# Patient Record
Sex: FEMALE | Race: BLACK OR AFRICAN AMERICAN
[De-identification: names, ages, dates, MRNs, and addresses within clinical notes are randomized per-mention and may not be internally consistent; named-entity substitution may affect disease eponyms.]

---

## 2018-06-14 ENCOUNTER — HOSPITAL ENCOUNTER (OUTPATIENT)
Dept: HOSPITAL 92 - BICMAMMO | Age: 63
Discharge: HOME | End: 2018-06-14
Payer: COMMERCIAL

## 2018-06-14 DIAGNOSIS — M85.88: ICD-10-CM

## 2018-06-14 DIAGNOSIS — M81.0: ICD-10-CM

## 2018-06-14 DIAGNOSIS — Z78.0: ICD-10-CM

## 2018-06-14 DIAGNOSIS — Z80.3: ICD-10-CM

## 2018-06-14 DIAGNOSIS — Z01.419: ICD-10-CM

## 2018-06-14 DIAGNOSIS — Z12.31: Primary | ICD-10-CM

## 2018-06-14 PROCEDURE — 77080 DXA BONE DENSITY AXIAL: CPT

## 2018-06-14 PROCEDURE — 77063 BREAST TOMOSYNTHESIS BI: CPT

## 2018-06-14 PROCEDURE — 77067 SCR MAMMO BI INCL CAD: CPT

## 2019-02-22 ENCOUNTER — HOSPITAL ENCOUNTER (OUTPATIENT)
Dept: HOSPITAL 92 - BICCT | Age: 64
Discharge: HOME | End: 2019-02-22
Attending: OBSTETRICS & GYNECOLOGY
Payer: MEDICARE

## 2019-02-22 DIAGNOSIS — K76.89: ICD-10-CM

## 2019-02-22 DIAGNOSIS — R19.00: Primary | ICD-10-CM

## 2019-02-22 LAB — ESTIMATED GFR-MDRD - POC: 76

## 2019-02-22 PROCEDURE — 82565 ASSAY OF CREATININE: CPT

## 2019-02-22 PROCEDURE — 74178 CT ABD&PLV WO CNTR FLWD CNTR: CPT

## 2019-02-22 NOTE — CT
CT ABDOMEN AND PELVIS WITH AND WITHOUT IV CONTRAST:

 

Date: 2-22-19 

 

Provided Clinical History: 

Abdominal and pelvic swelling mass and lump. 

 

FINDINGS: 

The visualized lung bases are free of significant opacity. There is a calcified nodule involving the 
right lower lung adjacent to the IVC. 

 

There are multiple small, subcentimeter foci of diminished attenuation involving the periphery of the
 liver to small to definitively characterize. The spleen, pancreas, and adrenal glands appear unremar
kable. The kidneys demonstrate no evidence for hydronephrosis or mass. 

 

There is a large complex cystic and solid mass present within the central aspects of the pelvis. This
 measures at least 15.7 x 8.8 cm in greatest transverse dimensions and at least 9.1 cm in craniocauda
l dimension. This displaces and is inseparable from portions of the sigmoid colon and pelvic small leilani
wel. There is free intraperitoneal fluid present within the abdomen and pelvis. There is fluid extend
ing into the left inguinal canal compatible with fluid containing inguinal hernia. There is a linear 
density associated with small bowel in the left lateral midabdomen that may reflect post-operative ch
allie or potentially ingested material. 

 

There is no bowel dilatation or lymph node enlargement apparent. 

 

The osseous structures demonstrate no concerning osteoblastic or osteolytic lesions. 

 

IMPRESSION: 

1. Large cystic and solid pelvic mass likely reflecting an ovarian carcinoma. Free intraperitoneal fl
uid suggests associated peritoneal carcinomatosis. 

2. Multiple subcentimeter hepatic hypodensities, too small to definitively characterize. Given their 
primarily peripheral, subcapsular location, metastases cannot be excluded.  

 

POS: [facility]

## 2019-06-24 ENCOUNTER — HOSPITAL ENCOUNTER (OUTPATIENT)
Dept: HOSPITAL 92 - BICMAMMO | Age: 64
Discharge: HOME | End: 2019-06-24
Payer: MEDICARE

## 2019-06-24 DIAGNOSIS — Z80.3: ICD-10-CM

## 2019-06-24 DIAGNOSIS — Z12.31: Primary | ICD-10-CM

## 2019-06-24 PROCEDURE — 77067 SCR MAMMO BI INCL CAD: CPT

## 2019-06-24 PROCEDURE — 77063 BREAST TOMOSYNTHESIS BI: CPT

## 2019-06-24 NOTE — MMO
Bilateral MAMMO Bilat Screen [facility].

 

CLINICAL HISTORY:

Patient is 63 years old and is seen for screening. The patient has the following

family history of breast cancer:  sister.  The patient has no personal history

of cancer.

 

VIEWS:

The views performed were:  bilateral craniocaudal with tomosynthesis and

bilateral mediolateral oblique with tomosynthesis.

 

FILMS COMPARED:

The present examination has been compared to prior imaging studies performed at

[facility] on 05/27/2014, and at [facility]

on 11/08/2010, 01/20/2017 and 06/14/2018.

 

MAMMOGRAM FINDINGS:

There are scattered fibroglandular densities.

 

There are no suspicious masses, suspicious calcifications, or new areas of

architectural distortion.

 

IMPRESSION:

THERE IS NO MAMMOGRAPHIC EVIDENCE OF MALIGNANCY.

 

A ROUTINE FOLLOW-UP MAMMOGRAM IN 1 YEAR IS RECOMMENDED.

 

THE RESULTS OF THIS EXAM WERE SENT TO THE PATIENT.

 

ACR BI-RADS Category 1 - Negative

 

MAMMOGRAPHY NOTE:

 1. A negative mammogram report should not delay a biopsy if a dominant of

 clinically suspicious mass is present.

 2. Approximately 10% to 15% of breast cancers are not detected by

 mammography.

 3. Adenosis and dense breasts may obscure an underlying neoplasm.

## 2021-04-09 ENCOUNTER — HOSPITAL ENCOUNTER (OUTPATIENT)
Dept: HOSPITAL 92 - BICMAMMO | Age: 66
Discharge: HOME | End: 2021-04-09
Payer: MEDICARE

## 2021-04-09 DIAGNOSIS — Z80.3: ICD-10-CM

## 2021-04-09 DIAGNOSIS — M85.89: ICD-10-CM

## 2021-04-09 DIAGNOSIS — M81.0: ICD-10-CM

## 2021-04-09 DIAGNOSIS — Z12.31: Primary | ICD-10-CM

## 2021-04-09 PROCEDURE — 77080 DXA BONE DENSITY AXIAL: CPT

## 2021-04-09 PROCEDURE — 77063 BREAST TOMOSYNTHESIS BI: CPT

## 2021-04-09 PROCEDURE — 77067 SCR MAMMO BI INCL CAD: CPT

## 2021-06-18 ENCOUNTER — HOSPITAL ENCOUNTER (OUTPATIENT)
Dept: HOSPITAL 9 - MADLAB | Age: 66
Discharge: HOME | End: 2021-06-18
Attending: FAMILY MEDICINE
Payer: MEDICARE

## 2021-06-18 DIAGNOSIS — M16.0: ICD-10-CM

## 2021-06-18 DIAGNOSIS — M25.551: Primary | ICD-10-CM

## 2021-06-18 PROCEDURE — 72170 X-RAY EXAM OF PELVIS: CPT

## 2022-09-11 ENCOUNTER — HOSPITAL ENCOUNTER (EMERGENCY)
Dept: HOSPITAL 9 - MADERS | Age: 67
Discharge: HOME | End: 2022-09-11
Payer: MEDICARE

## 2022-09-11 DIAGNOSIS — Z79.82: ICD-10-CM

## 2022-09-11 DIAGNOSIS — M79.651: ICD-10-CM

## 2022-09-11 DIAGNOSIS — L29.9: Primary | ICD-10-CM

## 2022-09-11 DIAGNOSIS — M81.0: ICD-10-CM

## 2022-09-11 DIAGNOSIS — M79.18: ICD-10-CM

## 2022-09-11 DIAGNOSIS — M79.652: ICD-10-CM

## 2022-09-11 DIAGNOSIS — G89.29: ICD-10-CM

## 2022-09-11 DIAGNOSIS — M19.09: ICD-10-CM

## 2022-09-11 DIAGNOSIS — Z79.899: ICD-10-CM

## 2022-09-11 DIAGNOSIS — I10: ICD-10-CM

## 2022-09-11 LAB
ALBUMIN SERPL BCG-MCNC: 4.2 G/DL (ref 3.4–4.8)
ALP SERPL-CCNC: 37 U/L (ref 40–110)
ALT SERPL W P-5'-P-CCNC: 16 U/L (ref 8–55)
ANION GAP SERPL CALC-SCNC: 15 MMOL/L (ref 10–20)
AST SERPL-CCNC: 16 U/L (ref 5–34)
BASOPHILS # BLD AUTO: 0.1 THOU/UL (ref 0–0.2)
BASOPHILS NFR BLD AUTO: 1.6 % (ref 0–1)
BILIRUB SERPL-MCNC: 0.4 MG/DL (ref 0.2–1.2)
BUN SERPL-MCNC: 13 MG/DL (ref 9.8–20.1)
CALCIUM SERPL-MCNC: 9.8 MG/DL (ref 7.8–10.44)
CHLORIDE SERPL-SCNC: 108 MMOL/L (ref 98–107)
CO2 SERPL-SCNC: 21 MMOL/L (ref 23–31)
CREAT CL PREDICTED SERPL C-G-VRATE: 0 ML/MIN (ref 70–130)
EOSINOPHIL # BLD AUTO: 0.2 THOU/UL (ref 0–0.7)
EOSINOPHIL NFR BLD AUTO: 3.2 % (ref 0–10)
GLOBULIN SER CALC-MCNC: 3 G/DL (ref 2.4–3.5)
GLUCOSE SERPL-MCNC: 96 MG/DL (ref 80–115)
HGB BLD-MCNC: 10.1 G/DL (ref 12–16)
LYMPHOCYTES # BLD AUTO: 2.1 THOU/UL (ref 1.2–3.4)
LYMPHOCYTES NFR BLD AUTO: 35.9 % (ref 21–51)
MCH RBC QN AUTO: 27.2 PG (ref 27–31)
MCV RBC AUTO: 87.5 FL (ref 78–98)
MONOCYTES # BLD AUTO: 0.4 THOU/UL (ref 0.11–0.59)
MONOCYTES NFR BLD AUTO: 6.8 % (ref 0–10)
NEUTROPHILS # BLD AUTO: 3 THOU/UL (ref 1.4–6.5)
NEUTROPHILS NFR BLD AUTO: 52.6 % (ref 42–75)
PLATELET # BLD AUTO: 198 THOU/UL (ref 130–400)
POTASSIUM SERPL-SCNC: 3.9 MMOL/L (ref 3.5–5.1)
RBC # BLD AUTO: 3.71 MILL/UL (ref 4.2–5.4)
SODIUM SERPL-SCNC: 140 MMOL/L (ref 136–145)
WBC # BLD AUTO: 5.7 THOU/UL (ref 4.8–10.8)

## 2022-09-11 PROCEDURE — 80053 COMPREHEN METABOLIC PANEL: CPT

## 2022-09-11 PROCEDURE — 99283 EMERGENCY DEPT VISIT LOW MDM: CPT

## 2022-09-11 PROCEDURE — 36415 COLL VENOUS BLD VENIPUNCTURE: CPT

## 2022-09-11 PROCEDURE — 85025 COMPLETE CBC W/AUTO DIFF WBC: CPT

## 2024-09-24 ENCOUNTER — HOSPITAL ENCOUNTER (OUTPATIENT)
Dept: HOSPITAL 92 - CSHMRI | Age: 69
Discharge: HOME | End: 2024-09-24
Attending: NURSE PRACTITIONER
Payer: MEDICARE

## 2024-09-24 DIAGNOSIS — K86.2: ICD-10-CM

## 2024-09-24 DIAGNOSIS — N28.1: ICD-10-CM

## 2024-09-24 DIAGNOSIS — C56.9: ICD-10-CM

## 2024-09-24 DIAGNOSIS — K76.89: Primary | ICD-10-CM

## 2024-09-24 DIAGNOSIS — K76.9: ICD-10-CM

## 2024-09-24 DIAGNOSIS — K83.8: ICD-10-CM

## 2024-09-24 LAB — EGFRCR SERPLBLD CKD-EPI 2021: 69 ML/MIN/{1.73_M2}

## 2024-09-24 PROCEDURE — 82565 ASSAY OF CREATININE: CPT

## 2024-09-24 PROCEDURE — 74183 MRI ABD W/O CNTR FLWD CNTR: CPT

## 2024-09-24 PROCEDURE — 36415 COLL VENOUS BLD VENIPUNCTURE: CPT

## 2024-11-05 ENCOUNTER — HOSPITAL ENCOUNTER (OUTPATIENT)
Dept: HOSPITAL 92 - CSHMAMMO | Age: 69
Discharge: HOME | End: 2024-11-05
Payer: MEDICARE

## 2024-11-05 DIAGNOSIS — M81.0: ICD-10-CM

## 2024-11-05 DIAGNOSIS — Z80.3: ICD-10-CM

## 2024-11-05 DIAGNOSIS — Z12.31: Primary | ICD-10-CM

## 2024-11-05 DIAGNOSIS — M85.89: ICD-10-CM

## 2024-11-05 PROCEDURE — 77067 SCR MAMMO BI INCL CAD: CPT

## 2024-11-05 PROCEDURE — 77080 DXA BONE DENSITY AXIAL: CPT

## 2024-11-05 PROCEDURE — 77063 BREAST TOMOSYNTHESIS BI: CPT
